# Patient Record
Sex: FEMALE | Race: WHITE
[De-identification: names, ages, dates, MRNs, and addresses within clinical notes are randomized per-mention and may not be internally consistent; named-entity substitution may affect disease eponyms.]

---

## 2019-12-06 ENCOUNTER — HOSPITAL ENCOUNTER (EMERGENCY)
Dept: HOSPITAL 62 - ER | Age: 39
Discharge: HOME | End: 2019-12-06
Payer: SELF-PAY

## 2019-12-06 VITALS — SYSTOLIC BLOOD PRESSURE: 123 MMHG | DIASTOLIC BLOOD PRESSURE: 80 MMHG

## 2019-12-06 DIAGNOSIS — N12: Primary | ICD-10-CM

## 2019-12-06 DIAGNOSIS — Z88.2: ICD-10-CM

## 2019-12-06 DIAGNOSIS — R10.2: ICD-10-CM

## 2019-12-06 DIAGNOSIS — Z90.49: ICD-10-CM

## 2019-12-06 DIAGNOSIS — F17.200: ICD-10-CM

## 2019-12-06 DIAGNOSIS — R10.9: ICD-10-CM

## 2019-12-06 DIAGNOSIS — E87.6: ICD-10-CM

## 2019-12-06 DIAGNOSIS — Z88.0: ICD-10-CM

## 2019-12-06 LAB
ADD MANUAL DIFF: NO
ALBUMIN SERPL-MCNC: 3.8 G/DL (ref 3.5–5)
ALP SERPL-CCNC: 91 U/L (ref 38–126)
ANION GAP SERPL CALC-SCNC: 9 MMOL/L (ref 5–19)
APPEARANCE UR: (no result)
APTT PPP: YELLOW S
AST SERPL-CCNC: 30 U/L (ref 14–36)
BASOPHILS # BLD AUTO: 0 10^3/UL (ref 0–0.2)
BASOPHILS NFR BLD AUTO: 0.3 % (ref 0–2)
BILIRUB DIRECT SERPL-MCNC: 0.1 MG/DL (ref 0–0.4)
BILIRUB SERPL-MCNC: 0.5 MG/DL (ref 0.2–1.3)
BILIRUB UR QL STRIP: NEGATIVE
BUN SERPL-MCNC: 4 MG/DL (ref 7–20)
CALCIUM: 8.9 MG/DL (ref 8.4–10.2)
CHLORIDE SERPL-SCNC: 101 MMOL/L (ref 98–107)
CO2 SERPL-SCNC: 27 MMOL/L (ref 22–30)
EOSINOPHIL # BLD AUTO: 0 10^3/UL (ref 0–0.6)
EOSINOPHIL NFR BLD AUTO: 0.5 % (ref 0–6)
ERYTHROCYTE [DISTWIDTH] IN BLOOD BY AUTOMATED COUNT: 14.3 % (ref 11.5–14)
GLUCOSE SERPL-MCNC: 115 MG/DL (ref 75–110)
GLUCOSE UR STRIP-MCNC: NEGATIVE MG/DL
HCT VFR BLD CALC: 39.8 % (ref 36–47)
HGB BLD-MCNC: 13.8 G/DL (ref 12–15.5)
KETONES UR STRIP-MCNC: NEGATIVE MG/DL
LYMPHOCYTES # BLD AUTO: 1.7 10^3/UL (ref 0.5–4.7)
LYMPHOCYTES NFR BLD AUTO: 22.4 % (ref 13–45)
MCH RBC QN AUTO: 29.9 PG (ref 27–33.4)
MCHC RBC AUTO-ENTMCNC: 34.6 G/DL (ref 32–36)
MCV RBC AUTO: 86 FL (ref 80–97)
MONOCYTES # BLD AUTO: 0.8 10^3/UL (ref 0.1–1.4)
MONOCYTES NFR BLD AUTO: 10.3 % (ref 3–13)
NEUTROPHILS # BLD AUTO: 5.1 10^3/UL (ref 1.7–8.2)
NEUTS SEG NFR BLD AUTO: 66.5 % (ref 42–78)
NITRITE UR QL STRIP: NEGATIVE
PH UR STRIP: 6 [PH] (ref 5–9)
PLATELET # BLD: 188 10^3/UL (ref 150–450)
POTASSIUM SERPL-SCNC: 3.3 MMOL/L (ref 3.6–5)
PROT SERPL-MCNC: 7.1 G/DL (ref 6.3–8.2)
PROT UR STRIP-MCNC: NEGATIVE MG/DL
RBC # BLD AUTO: 4.61 10^6/UL (ref 3.72–5.28)
SP GR UR STRIP: 1.01
TOTAL CELLS COUNTED % (AUTO): 100 %
UROBILINOGEN UR-MCNC: NEGATIVE MG/DL (ref ?–2)
WBC # BLD AUTO: 7.7 10^3/UL (ref 4–10.5)

## 2019-12-06 PROCEDURE — 80053 COMPREHEN METABOLIC PANEL: CPT

## 2019-12-06 PROCEDURE — 87086 URINE CULTURE/COLONY COUNT: CPT

## 2019-12-06 PROCEDURE — 36415 COLL VENOUS BLD VENIPUNCTURE: CPT

## 2019-12-06 PROCEDURE — 74176 CT ABD & PELVIS W/O CONTRAST: CPT

## 2019-12-06 PROCEDURE — 81025 URINE PREGNANCY TEST: CPT

## 2019-12-06 PROCEDURE — 85025 COMPLETE CBC W/AUTO DIFF WBC: CPT

## 2019-12-06 PROCEDURE — 99284 EMERGENCY DEPT VISIT MOD MDM: CPT

## 2019-12-06 PROCEDURE — 81001 URINALYSIS AUTO W/SCOPE: CPT

## 2019-12-06 PROCEDURE — 96372 THER/PROPH/DIAG INJ SC/IM: CPT

## 2019-12-06 NOTE — RADIOLOGY REPORT (SQ)
EXAM DESCRIPTION:  CT ABD/PELVIS NO ORAL OR IV



COMPLETED DATE/TIME:  12/6/2019 5:47 pm



REASON FOR STUDY:  R flank, RLQ pain



COMPARISON:  None.



TECHNIQUE:  CT scan of the abdomen and pelvis performed without intravenous or oral contrast. Images 
reviewed with lung, soft tissue, and bone windows. Reconstructed coronal and sagittal MPR images revi
ewed. All images stored on PACS.

All CT scanners at this facility use dose modulation, iterative reconstruction, and/or weight based d
osing when appropriate to reduce radiation dose to as low as reasonably achievable (ALARA).

[facility]: Dose Right  [facility]: CareDose    [facility]: Dose Right    [facility]: Teradose 4D    [facility]: Smart Technologies



RADIATION DOSE:  CT Rad equipment meets quality standard of care and radiation dose reduction techniq
ues were employed. CTDIvol: 5.8 mGy. DLP: 298 mGy-cm.mGy.



LIMITATIONS:  None.



FINDINGS:  LOWER CHEST: No significant findings. No nodules or infiltrates.

NON-CONTRASTED LIVER, SPLEEN, ADRENALS: The liver is unremarkable.  There is splenomegaly.  No adrena
l mass.

PANCREAS: No masses. No peripancreatic inflammatory changes.

GALLBLADDER: Surgically absent.

RIGHT KIDNEY AND URETER: No suspicious masses. Assessment limited by lack of IV contrast.   No signif
icant calcifications.   No hydronephrosis or hydroureter.

LEFT KIDNEY AND URETER: No suspicious masses. Assessment limited by lack of IV contrast.   No signifi
cant calcifications.   No hydronephrosis or hydroureter.

AORTA AND RETROPERITONEUM: No aneurysm. No retroperitoneal masses or adenopathy.

BOWEL AND PERITONEAL CAVITY: No obvious masses or inflammatory changes. No free fluid.

APPENDIX: Not identified.

PELVIS, BLADDER, AND ABDOMINAL WALL:No abnormal masses. No free fluid. Bladder normal.

BONES: No significant findings.

OTHER: No other significant finding.



IMPRESSION:  There is no urinary pathology.  There is splenomegaly.  The appendix is not identified, 
but no pericecal inflammation is seen.



COMMENT:  Quality ID # [id number]: Final reports with documentation of one or more dose reduction techniques
 (e.g., Automated exposure control, adjustment of the mA and/or kV according to patient size, use of 
iterative reconstruction technique)



TECHNICAL DOCUMENTATION:  JOB ID:  [id number]

 2011 [employer]- All Rights Reserved



Reading location - IP/workstation name: [email or web address]

## 2019-12-06 NOTE — ER DOCUMENT REPORT
ED Medical Screen (RME)





- General


Chief Complaint: Flank Pain


Stated Complaint: FLANK PAIN


Time Seen by Provider: 12/06/19 14:04


TRAVEL OUTSIDE OF THE [place] IN LAST 30 DAYS: No





- Related Data


Allergies/Adverse Reactions: 


                                        





amoxicillin Allergy (Verified 10/30/18 15:37)


   


nitrofurantoin [From Macrobid] Allergy (Verified 10/30/18 15:37)


   


Sulfa (Sulfonamide Antibiotics) Allergy (Verified 10/30/18 15:37)


   











Past Medical History





- Social History


Chew tobacco use (# tins/day): No


Frequency of alcohol use: Occasional


Drug Abuse: None


Renal/ Medical History: Denies: Hx Peritoneal Dialysis


Past Surgical History: Reports: Hx Cholecystectomy





Physical Exam





- Vital signs


Vitals: 





                                        











Temp Pulse Resp BP Pulse Ox


 


 98.6 F   117 H  19   156/96 H  100 


 


 12/06/19 13:30  12/06/19 13:30  12/06/19 13:30  12/06/19 13:30  12/06/19 13:30














Course





- Vital Signs


Vital signs: 





                                        











Temp Pulse Resp BP Pulse Ox


 


 98.6 F   117 H  19   156/96 H  100 


 


 12/06/19 13:58  12/06/19 13:30  12/06/19 13:58  12/06/19 13:30  12/06/19 13:58

## 2019-12-06 NOTE — ER DOCUMENT REPORT
ED GI/





- General


Chief Complaint: Flank Pain


Stated Complaint: FLANK PAIN


Time Seen by Provider: 12/06/19 14:04


Primary Care Provider: 


[facility] [Provider Group] - 12/09/19


Mode of Arrival: Ambulatory


Information source: Patient


Notes: 





Patient reports a 4-day history of lower pelvic cramping that radiates to right 

side and right flank area.  Patient denies any nausea vomiting or diarrhea.  

Patient denies any urinary symptoms.  Patient states that she has had a decrease

in her appetite.


TRAVEL OUTSIDE OF THE [place] IN LAST 30 DAYS: No





- HPI


Patient complains to provider of: Flank pain, Pelvic pain.  No: Vaginal 

bleeding, Vaginal discharge, Vomiting


Onset: Other - 4 days


Timing/Duration: Persistent


Quality of pain: Achy


Pain Level: 3


Location: RLQ, Right flank


Vaginal bleeding (Compared to normal period): None


Associated symptoms: Loss of appetite.  denies: Constipation, Diarrhea, Dysuria,

Fever, Nausea, Urinary hesitancy, Urinary frequency, Urinary retention, Urinary 

urgency, Vaginal discharge, Vomiting


Exacerbated by: Denies


Relieved by: Denies


Similar symptoms previously: No


Recently seen / treated by doctor: No





- Related Data


Allergies/Adverse Reactions: 


                                        





amoxicillin Allergy (Verified 10/30/18 15:37)


   


nitrofurantoin [From Macrobid] Allergy (Verified 10/30/18 15:37)


   


Sulfa (Sulfonamide Antibiotics) Allergy (Verified 10/30/18 15:37)


   











Past Medical History





- General


Information source: Patient





- Social History


Smoking Status: Current Every Day Smoker


Chew tobacco use (# tins/day): No


Frequency of alcohol use: Occasional


Drug Abuse: None


Occupation: Nurse


Family History: None


Patient has suicidal ideation: No


Patient has homicidal ideation: No





- Medical History


Medical History: Negative


Renal/ Medical History: Denies: Hx Peritoneal Dialysis


Past Surgical History: Reports: Hx Cholecystectomy, Hx Orthopedic Surgery





Review of Systems





- Review of Systems


Constitutional: No symptoms reported.  denies: Fever, Recent illness


EENT: No symptoms reported


Cardiovascular: No symptoms reported.  denies: Chest pain, Dizziness, 

Lightheaded


Respiratory: No symptoms reported.  denies: Cough, Short of breath


Gastrointestinal: Abdominal pain, Poor appetite.  denies: Diarrhea, Nausea, 

Vomiting, Constipation, Poor fluid intake


Genitourinary: Flank pain.  denies: Dysuria


Female Genitourinary: No symptoms reported.  denies: Vaginal discharge, Vaginal 

bleeding


Musculoskeletal: Back pain


Skin: No symptoms reported


Hematologic/Lymphatic: No symptoms reported


Neurological/Psychological: No symptoms reported





Physical Exam





- Vital signs


Vitals: 


                                        











Temp Pulse Resp BP Pulse Ox


 


 98.6 F   117 H  19   156/96 H  100 


 


 12/06/19 13:30  12/06/19 13:30  12/06/19 13:30  12/06/19 13:30  12/06/19 13:30














- General


General appearance: Appears well, Alert


In distress: None





- HEENT


Head: Normocephalic, Atraumatic


Eyes: Normal


Conjunctiva: Normal


Nasal: Normal


Mouth/Lips: Normal


Mucous membranes: Normal


Neck: Normal, Supple.  No: Lymphadenopathy





- Respiratory


Respiratory status: No respiratory distress


Chest status: Nontender


Breath sounds: Normal.  No: Rales, Rhonchi, Stridor, Wheezing


Chest palpation: Normal





- Cardiovascular


Rhythm: Regular


Heart sounds: S1 appreciated, S2 appreciated





- Abdominal


Inspection: Normal


Distension: No distension


Bowel sounds: Normal


Tenderness: Tender - Suprapubic, right lower quadrant, right upper quadrant





- Back


Back: CVA tenderness - Right CVA.  No: Vertebra tenderness





- Extremities


General upper extremity: Normal inspection, Normal ROM


General lower extremity: Normal inspection, Normal ROM





- Neurological


Neuro grossly intact: Yes


Cognition: Normal


[place] Coma Scale Eye Opening: Spontaneous


[place] Coma Scale Verbal: Oriented


[place] Coma Scale Motor: Obeys Commands


[place] Coma Scale Total: 15





- Psychological


Associated symptoms: Normal affect, Normal mood





- Skin


Skin Temperature: Warm


Skin Moisture: Dry


Skin Color: Normal





Course





- Re-evaluation


Re-evalutation: 





12/06/19 18:51


Patient with UTI, patient does have tenderness to the right lower quadrant and 

right flank area.  CT noncontrasted was ordered to evaluate for possible 

obstructive stone.  Patient without any obstructive uropathy noted on scan.  

Appendix was not visualized although there was no pericecal inflammation noted. 

Consulted with Dr. Bahena regarding patient presentation.  Recommends giving 

patient a dose of Rocephin IV and Cipro orally.  Patient is refusing IV and is 

agreeable for IM injection at this time.  Patient without any fever or 

leukocytosis.  Patient with stable vital signs at this time.  Good return 

precautions discussed with patient.  Patient verbalized understanding and agrees

with plan of care at this time.





- Vital Signs


Vital signs: 


                                        











Temp Pulse Resp BP Pulse Ox


 


 97.9 F   89   16   123/80   99 


 


 12/06/19 19:52  12/06/19 19:52  12/06/19 19:52  12/06/19 19:52  12/06/19 19:52














- Laboratory


Result Diagrams: 


                                 12/06/19 14:47





                                 12/06/19 14:47


Laboratory results interpreted by me: 


                                        











  12/06/19 12/06/19 12/06/19





  14:32 14:47 14:47


 


RDW   14.3 H 


 


Potassium    3.3 L


 


BUN    4 L


 


Glucose    115 H


 


Urine Blood  LARGE H  


 


Ur Leukocyte Esterase  MODERATE H  


 


Urine Ascorbic Acid  20 H  











12/06/19 19:58





                               Labs- Entire Visit











  12/06/19 12/06/19 12/06/19





  14:32 14:47 14:47


 


WBC   7.7 


 


RBC   4.61 


 


Hgb   13.8 


 


Hct   39.8 


 


MCV   86 


 


MCH   29.9 


 


MCHC   34.6 


 


RDW   14.3 H 


 


Plt Count   188 


 


Lymph % (Auto)   22.4 


 


Mono % (Auto)   10.3 


 


Eos % (Auto)   0.5 


 


Baso % (Auto)   0.3 


 


Absolute Neuts (auto)   5.1 


 


Absolute Lymphs (auto)   1.7 


 


Absolute Monos (auto)   0.8 


 


Absolute Eos (auto)   0.0 


 


Absolute Basos (auto)   0.0 


 


Seg Neutrophils %   66.5 


 


Sodium    137.3


 


Potassium    3.3 L


 


Chloride    101


 


Carbon Dioxide    27


 


Anion Gap    9


 


BUN    4 L


 


Creatinine    0.63


 


Est GFR ( Amer)    > 60


 


Est GFR (MDRD) Non-Af    > 60


 


Glucose    115 H


 


Calcium    8.9


 


Total Bilirubin    0.5


 


Direct Bilirubin    0.1


 


Neonat Total Bilirubin    Not Reportable


 


Neonat Direct Bilirubin    Not Reportable


 


Neonat Indirect Bili    Not Reportable


 


AST    30


 


ALT    37


 


Alkaline Phosphatase    91


 


Total Protein    7.1


 


Albumin    3.8


 


Urine Color  YELLOW  


 


Urine Appearance  SLIGHTLY-CLOUDY  


 


Urine pH  6.0  


 


Ur Specific [place]  1.011  


 


Urine Protein  NEGATIVE  


 


Urine Glucose (UA)  NEGATIVE  


 


Urine Ketones  NEGATIVE  


 


Urine Blood  LARGE H  


 


Urine Nitrite  NEGATIVE  


 


Urine Bilirubin  NEGATIVE  


 


Urine Urobilinogen  NEGATIVE  


 


Ur Leukocyte Esterase  MODERATE H  


 


Urine WBC (Auto)  40  


 


Urine RBC (Auto)  71  


 


Squamous Epi Cells Auto  1  


 


Urine Mucus (Auto)  MOD  


 


Urine Ascorbic Acid  20 H  


 


Urine HCG, Qual  NEGATIVE  














- Diagnostic Test


Radiology reviewed: Reports reviewed





Discharge





- Discharge


Clinical Impression: 


 Pyelonephritis, Hypokalemia





Condition: Stable


Disposition: HOME, SELF-CARE


Instructions:  Ciprofloxacin ([facility]), Hypokalemia ([facility]), Pyelonephritis ([facility]), 

Rocephin ([facility])


Additional Instructions: 


Return immediately for any new or worsening symptoms: Fever, vomiting, increased

pain or any new or concerning symptoms





Followup with your primary care provider, call tomorrow to make a followup 

appointment





Urine culture is pending at this time, we will call if you need any different 

treatment





Increase foods rich in potassium in diet.





Follow-up with your primary doctor for recheck.  They can recheck your urine as 

well as electrolytes.


Prescriptions: 


Ciprofloxacin HCl [Cipro 500 mg Tablet] 500 mg PO BID #20 tablet


Cephalexin Monohydrate [Keflex 500 mg Capsule] 500 mg PO Q6H 7 Days  capsule


Naproxen [Naprosyn 250 Nmg Tablet] 1 tab PO BID #14 tablet


Forms:  Return to Work


Referrals: 


[facility] [Provider Group] - 12/09/19